# Patient Record
Sex: FEMALE | Race: WHITE | URBAN - METROPOLITAN AREA
[De-identification: names, ages, dates, MRNs, and addresses within clinical notes are randomized per-mention and may not be internally consistent; named-entity substitution may affect disease eponyms.]

---

## 2017-07-26 ENCOUNTER — OFFICE VISIT (OUTPATIENT)
Dept: FAMILY MEDICINE | Facility: CLINIC | Age: 32
End: 2017-07-26
Payer: COMMERCIAL

## 2017-07-26 VITALS
BODY MASS INDEX: 33.84 KG/M2 | WEIGHT: 191 LBS | RESPIRATION RATE: 20 BRPM | HEIGHT: 63 IN | TEMPERATURE: 98.2 F | SYSTOLIC BLOOD PRESSURE: 117 MMHG | OXYGEN SATURATION: 96 % | DIASTOLIC BLOOD PRESSURE: 82 MMHG | HEART RATE: 81 BPM

## 2017-07-26 DIAGNOSIS — Z00.00 ROUTINE GENERAL MEDICAL EXAMINATION AT A HEALTH CARE FACILITY: Primary | ICD-10-CM

## 2017-07-26 DIAGNOSIS — Z12.4 SCREENING FOR MALIGNANT NEOPLASM OF CERVIX: ICD-10-CM

## 2017-07-26 DIAGNOSIS — Z11.3 SCREEN FOR STD (SEXUALLY TRANSMITTED DISEASE): ICD-10-CM

## 2017-07-26 DIAGNOSIS — Z13.6 SCREENING FOR CARDIOVASCULAR CONDITION: ICD-10-CM

## 2017-07-26 DIAGNOSIS — Z13.1 SCREENING FOR DIABETES MELLITUS: ICD-10-CM

## 2017-07-26 DIAGNOSIS — Z23 NEED FOR VACCINATION: ICD-10-CM

## 2017-07-26 DIAGNOSIS — Z23 NEED FOR PROPHYLACTIC VACCINATION WITH TETANUS-DIPHTHERIA (TD): ICD-10-CM

## 2017-07-26 PROBLEM — K58.0 IRRITABLE BOWEL SYNDROME WITH DIARRHEA: Status: ACTIVE | Noted: 2017-07-26

## 2017-07-26 LAB
CHOLEST SERPL-MCNC: 189 MG/DL
GLUCOSE SERPL-MCNC: 85 MG/DL (ref 70–99)
HDLC SERPL-MCNC: 52 MG/DL
HIV 1+2 AB+HIV1 P24 AG SERPL QL IA: NORMAL
LDLC SERPL CALC-MCNC: 121 MG/DL
NONHDLC SERPL-MCNC: 137 MG/DL
T PALLIDUM IGG+IGM SER QL: NEGATIVE
TRIGL SERPL-MCNC: 79 MG/DL

## 2017-07-26 PROCEDURE — 90471 IMMUNIZATION ADMIN: CPT | Performed by: PHYSICIAN ASSISTANT

## 2017-07-26 PROCEDURE — 87491 CHLMYD TRACH DNA AMP PROBE: CPT | Performed by: PHYSICIAN ASSISTANT

## 2017-07-26 PROCEDURE — 80061 LIPID PANEL: CPT | Performed by: PHYSICIAN ASSISTANT

## 2017-07-26 PROCEDURE — 99385 PREV VISIT NEW AGE 18-39: CPT | Mod: 25 | Performed by: PHYSICIAN ASSISTANT

## 2017-07-26 PROCEDURE — 82947 ASSAY GLUCOSE BLOOD QUANT: CPT | Performed by: PHYSICIAN ASSISTANT

## 2017-07-26 PROCEDURE — 36415 COLL VENOUS BLD VENIPUNCTURE: CPT | Performed by: PHYSICIAN ASSISTANT

## 2017-07-26 PROCEDURE — 87389 HIV-1 AG W/HIV-1&-2 AB AG IA: CPT | Performed by: PHYSICIAN ASSISTANT

## 2017-07-26 PROCEDURE — 90715 TDAP VACCINE 7 YRS/> IM: CPT | Performed by: PHYSICIAN ASSISTANT

## 2017-07-26 PROCEDURE — 87591 N.GONORRHOEAE DNA AMP PROB: CPT | Performed by: PHYSICIAN ASSISTANT

## 2017-07-26 PROCEDURE — 86780 TREPONEMA PALLIDUM: CPT | Performed by: PHYSICIAN ASSISTANT

## 2017-07-26 PROCEDURE — 87624 HPV HI-RISK TYP POOLED RSLT: CPT | Performed by: PHYSICIAN ASSISTANT

## 2017-07-26 PROCEDURE — 90707 MMR VACCINE SC: CPT | Performed by: PHYSICIAN ASSISTANT

## 2017-07-26 PROCEDURE — 90472 IMMUNIZATION ADMIN EACH ADD: CPT | Performed by: PHYSICIAN ASSISTANT

## 2017-07-26 PROCEDURE — G0145 SCR C/V CYTO,THINLAYER,RESCR: HCPCS | Performed by: PHYSICIAN ASSISTANT

## 2017-07-26 NOTE — LETTER
Monica Aguayo  [address]  [facility] [address]        July 28, 2017          Dear Monica,    We are writing to inform you of your test results.    - Your total cholesterol is normal (< 200), - LDL (bad cholesterol) is normal (<130), - HDL (good cholesterol) is normal, - Triglycerides are normal (<150)  - Your glucose (screening for diabetes) was normal.  - Your STD test panel was negative for infection.    Results for orders placed or performed in visit on 07/26/17   Lipid panel reflex to direct LDL   Result Value Ref Range    Cholesterol 189 <200 mg/dL    Triglycerides 79 <150 mg/dL    HDL Cholesterol 52 >49 mg/dL    LDL Cholesterol Calculated 121 (H) <100 mg/dL    Non HDL Cholesterol 137 (H) <130 mg/dL   Glucose   Result Value Ref Range    Glucose 85 70 - 99 mg/dL   HIV Antigen Antibody Combo   Result Value Ref Range    HIV Antigen Antibody Combo  NR     Nonreactive   Anti Treponema   Result Value Ref Range    Treponema pallidum Antibody Negative NEG   Chlamydia trachomatis PCR   Result Value Ref Range    Specimen Description Cervix     Chlamydia Trachomatis PCR  NEG     Negative   Neisseria gonorrhoeae PCR   Result Value Ref Range    Specimen Descrip Cervix     N Gonorrhea PCR  NEG     Negative       Thank you for choosing [facility].  We appreciate the opportunity to serve you and look forward to supporting your healthcare needs in the future.    If you have any questions or concerns, please call me or my staff at [phone].      Sincerely,        Itzel Milner PA-C

## 2017-07-26 NOTE — NURSING NOTE
"Chief Complaint   Patient presents with     Physical     /82  Pulse 81  Temp 98.2  F (36.8  C) (Oral)  Resp 20  Ht 5' 2.75\" (1.594 m)  Wt 191 lb (86.6 kg)  LMP 07/05/2017 (Approximate)  SpO2 96%  Breastfeeding? No  BMI 34.1 kg/m2 Estimated body mass index is 34.1 kg/(m^2) as calculated from the following:    Height as of this encounter: 5' 2.75\" (1.594 m).    Weight as of this encounter: 191 lb (86.6 kg).  BP completed using cuff size: alden Ash CMA    Health Maintenance Due   Topic Date Due     TETANUS IMMUNIZATION (SYSTEM ASSIGNED)  02/14/2005     PAP SCREENING Q3 YR (SYSTEM ASSIGNED)  02/14/2008     Health Maintenance reviewed at today's visit patient asked to schedule/complete:   Cervical Cancer:  Patient agrees to schedule  Immunizations:  Patient agrees to schedule    "

## 2017-07-26 NOTE — LETTER
August 4, 2017    Monica Aguayo  [address]  [facility] [address]    Dear Monica,  We are happy to inform you that your PAP smear result from 07/26/17 is normal.  We are now able to do a follow up test on PAP smears. The DNA test is for HPV (Human Papilloma Virus). Cervical cancer is closely linked with certain types of HPV. Your result showed no evidence of high risk HPV.  Therefore we recommend you return in 5 years for your next pap smear and HPV test.  You will still need to return to the clinic every year for an annual exam and other preventive tests.  Please contact the clinic at [phone] with any questions.  Sincerely,    Itzel Milner PA-C/sandeep

## 2017-07-26 NOTE — MR AVS SNAPSHOT
After Visit Summary   7/26/2017    Monica Aguayo    MRN: [id number]           Patient Information     Date Of Birth          1985        Visit Information        Provider Department      7/26/2017 7:50 AM Itzel Milner PA-C [facility]        Today's Diagnoses     Routine general medical examination at a health care facility    -  1    Screening for malignant neoplasm of cervix        Need for prophylactic vaccination with tetanus-diphtheria (TD)        Screening for cardiovascular condition        Screening for diabetes mellitus        Screen for STD (sexually transmitted disease)        Need for vaccination          Care Instructions      Preventive Health Recommendations  Female Ages 26 - 39  Yearly exam:   See your health care provider every year in order to    Review health changes.     Discuss preventive care.      Review your medicines if you your doctor has prescribed any.    Until age 30: Get a Pap test every three years (more often if you have had an abnormal result).    After age 30: Talk to your doctor about whether you should have a Pap test every 3 years or have a Pap test with HPV screening every 5 years.   You do not need a Pap test if your uterus was removed (hysterectomy) and you have not had cancer.  You should be tested each year for STDs (sexually transmitted diseases), if you're at risk.   Talk to your provider about how often to have your cholesterol checked.  If you are at risk for diabetes, you should have a diabetes test (fasting glucose).  Shots: Get a flu shot each year. Get a tetanus shot every 10 years.   Nutrition:     Eat at least 5 servings of fruits and vegetables each day.    Eat whole-grain bread, whole-wheat pasta and brown rice instead of white grains and rice.    Talk to your provider about Calcium and Vitamin D.     Lifestyle    Exercise at least 150 minutes a week (30 minutes a day, 5 days of the week). This will help you  "control your weight and prevent disease.    Limit alcohol to one drink per day.    No smoking.     Wear sunscreen to prevent skin cancer.    See your dentist every six months for an exam and cleaning.            Follow-ups after your visit        Who to contact     If you have questions or need follow up information about today's clinic visit or your schedule please contact [facility] directly at [phone].  Normal or non-critical lab and imaging results will be communicated to you by MyChart, letter or phone within 4 business days after the clinic has received the results. If you do not hear from us within 7 days, please contact the clinic through [employer] or phone. If you have a critical or abnormal lab result, we will notify you by phone as soon as possible.  Submit refill requests through [email or web address] or call your pharmacy and they will forward the refill request to us. Please allow 3 business days for your refill to be completed.          Additional Information About Your Visit        [employer]ar[employer] Information     [email or web address] lets you send messages to your doctor, view your test results, renew your prescriptions, schedule appointments and more. To sign up, go to [email or web address] . Click on \"Log in\" on the left side of the screen, which will take you to the Welcome page. Then click on \"Sign up Now\" on the right side of the page.     You will be asked to enter the access code listed below, as well as some personal information. Please follow the directions to create your username and password.     Your access code is: [email or web address]  Expires: 10/24/2017  8:32 AM     Your access code will  in 90 days. If you need help or a new code, please call your [facility] clinic or [phone].        Care EveryWhere ID     This is your Care EveryWhere ID. This could be used by other organizations to access your [facility] medical records  [id number]        Your Vitals Were     Pulse Temperature " "Respirations Height Last Period Pulse Oximetry    81 98.2  F (36.8  C) (Oral) 20 5' 2.75\" (1.594 m) 07/05/2017 (Approximate) 96%    Breastfeeding? BMI (Body Mass Index)                No 34.1 kg/m2           Blood Pressure from Last 3 Encounters:   07/26/17 117/82    Weight from Last 3 Encounters:   07/26/17 191 lb (86.6 kg)              We Performed the Following     Anti Treponema     Chlamydia trachomatis PCR     Glucose     HIV Antigen Antibody Combo     HPV High Risk Types DNA Cervical     Lipid panel reflex to direct LDL     MMR VIRUS IMMUNIZATION, SUBCUT     Neisseria gonorrhoeae PCR     Pap imaged thin layer screen with HPV - recommended age 30 - 65 years (select HPV order below)     TDAP VACCINE (ADACEL)        Primary Care Provider    None Specified       No primary provider on file.        Equal Access to Services     BECKA MARSH : Alexandria Gould, waisha rodriguezqevelia, sherleyybbrooks kaalmada adriel, bro portillo . So [facility] [phone].    ATENCIÓN: Si habla español, tiene a machado disposición servicios gratuitos de asistencia lingüística. Llame al [phone].    We comply with applicable federal civil rights laws and [place] laws. We do not discriminate on the basis of race, color, national origin, age, disability sex, sexual orientation or gender identity.            Thank you!     Thank you for choosing [facility]  for your care. Our goal is always to provide you with excellent care. Hearing back from our patients is one way we can continue to improve our services. Please take a few minutes to complete the written survey that you may receive in the mail after your visit with us. Thank you!             Your Updated Medication List - Protect others around you: Learn how to safely use, store and throw away your medicines at www.disposemymeds.org.      Notice  As of 7/26/2017  8:32 AM    You have not been prescribed any medications.      "

## 2017-07-26 NOTE — PROGRESS NOTES
SUBJECTIVE:   CC: Monica Aguayo is an 32 year old woman who presents for preventive health visit.     Healthy Habits:    Do you get at least three servings of calcium containing foods daily (dairy, green leafy vegetables, etc.)? yes    Amount of exercise or daily activities, outside of work: 0 day(s) per week    Problems taking medications regularly No    Medication side effects: No    Have you had an eye exam in the past two years? yes    Do you see a dentist twice per year? No, has appt scheduled for next month    Do you have sleep apnea, excessive snoring or daytime drowsiness? no        Today's PHQ-2 Score:   PHQ-2 ( 1999 Pfizer) 7/26/2017   Q1: Little interest or pleasure in doing things 0   Q2: Feeling down, depressed or hopeless 0   PHQ-2 Score 0       Abuse: Current or Past(Physical, Sexual or Emotional)- No  Do you feel safe in your environment - Yes  Social History   Substance Use Topics     Smoking status: Never Smoker     Smokeless tobacco: Never Used     Alcohol use No     The patient does not drink >3 drinks per day nor >7 drinks per week.    Reviewed orders with patient.  Reviewed health maintenance and updated orders accordingly - Yes    Mammogram not appropriate for this patient based on age.    Pertinent mammograms are reviewed under the imaging tab.  History of abnormal Pap smear: NO - age 30-65 PAP every 5 years with negative HPV co-testing recommended    Reviewed and updated as needed this visit by clinical staff  Tobacco  Allergies  Meds  Problems  Med Hx  Surg Hx  Fam Hx  Soc Hx          Reviewed and updated as needed this visit by Provider  Allergies  Meds  Problems              ROS:  C: NEGATIVE for fever, chills, change in weight  I: NEGATIVE for worrisome rashes, moles or lesions  E: NEGATIVE for vision changes or irritation  ENT: NEGATIVE for ear, mouth and throat problems  R: NEGATIVE for significant cough or SOB  B: NEGATIVE for masses, tenderness or discharge  CV: NEGATIVE  "for chest pain, palpitations or peripheral edema  GI: NEGATIVE for nausea, abdominal pain, heartburn, or change in bowel habits  : NEGATIVE for unusual urinary or vaginal symptoms. Periods are regular.  M: NEGATIVE for significant arthralgias or myalgia  N: NEGATIVE for weakness, dizziness or paresthesias  P: NEGATIVE for changes in mood or affect    OBJECTIVE:   /82  Pulse 81  Temp 98.2  F (36.8  C) (Oral)  Resp 20  Ht 5' 2.75\" (1.594 m)  Wt 191 lb (86.6 kg)  LMP 07/05/2017 (Approximate)  SpO2 96%  Breastfeeding? No  BMI 34.1 kg/m2  EXAM:  GENERAL: healthy, alert and no distress  EYES: Eyes grossly normal to inspection, PERRL and conjunctivae and sclerae normal  HENT: ear canals and TM's normal, nose and mouth without ulcers or lesions  NECK: no adenopathy, no asymmetry, masses, or scars and thyroid normal to palpation  RESP: lungs clear to auscultation - no rales, rhonchi or wheezes  BREAST: normal without masses, tenderness or nipple discharge and no palpable axillary masses or adenopathy  CV: regular rate and rhythm, normal S1 S2, no S3 or S4, no murmur, click or rub, no peripheral edema and peripheral pulses strong  ABDOMEN: soft, nontender, no hepatosplenomegaly, no masses and bowel sounds normal   (female): normal female external genitalia, normal urethral meatus, vaginal mucosa pink, moist, well rugated, and normal cervix/adnexa/uterus without masses or discharge  MS: no gross musculoskeletal defects noted, no edema  SKIN: no suspicious lesions or rashes  NEURO: Normal strength and tone, mentation intact and speech normal  PSYCH: mentation appears normal, affect normal/bright    ASSESSMENT/PLAN:       ICD-10-CM    1. Routine general medical examination at a health care facility Z00.00    2. Screening for malignant neoplasm of cervix Z12.4 HPV High Risk Types DNA Cervical     Pap imaged thin layer screen with HPV - recommended age 30 - 65 years (select HPV order below)   3. Screening for " "cardiovascular condition Z13.6 Lipid panel reflex to direct LDL   4. Screening for diabetes mellitus Z13.1 Glucose   5. Screen for STD (sexually transmitted disease) Z11.3 Chlamydia trachomatis PCR     Neisseria gonorrhoeae PCR     HIV Antigen Antibody Combo     Anti Treponema   6. Need for prophylactic vaccination with tetanus-diphtheria (TD) Z23 TDAP VACCINE (ADACEL)   7. Need for vaccination Z23 MMR VIRUS IMMUNIZATION, SUBCUT       COUNSELING:   Reviewed preventive health counseling, as reflected in patient instructions       reports that she has never smoked. She has never used smokeless tobacco.    Estimated body mass index is 34.1 kg/(m^2) as calculated from the following:    Height as of this encounter: 5' 2.75\" (1.594 m).    Weight as of this encounter: 191 lb (86.6 kg).   Weight management plan: Discussed healthy diet and exercise guidelines and patient will follow up in 12 months in clinic to re-evaluate.    Counseling Resources:  ATP IV Guidelines  Pooled Cohorts Equation Calculator  Breast Cancer Risk Calculator  FRAX Risk Assessment  ICSI Preventive Guidelines  Dietary Guidelines for Americans, 2010  USDA's MyPlate  ASA Prophylaxis  Lung CA Screening    Itzel Milner PA-C  [facility]  "

## 2017-07-27 LAB
C TRACH DNA SPEC QL NAA+PROBE: NORMAL
N GONORRHOEA DNA SPEC QL NAA+PROBE: NORMAL
SPECIMEN SOURCE: NORMAL
SPECIMEN SOURCE: NORMAL

## 2017-07-30 LAB
COPATH REPORT: NORMAL
PAP: NORMAL

## 2017-07-31 LAB
FINAL DIAGNOSIS: NORMAL
HPV HR 12 DNA CVX QL NAA+PROBE: NEGATIVE
HPV16 DNA SPEC QL NAA+PROBE: NEGATIVE
HPV18 DNA SPEC QL NAA+PROBE: NEGATIVE
SPECIMEN DESCRIPTION: NORMAL